# Patient Record
Sex: FEMALE | URBAN - NONMETROPOLITAN AREA
[De-identification: names, ages, dates, MRNs, and addresses within clinical notes are randomized per-mention and may not be internally consistent; named-entity substitution may affect disease eponyms.]

---

## 2018-02-13 ENCOUNTER — APPOINTMENT (RX ONLY)
Dept: URBAN - NONMETROPOLITAN AREA CLINIC 13 | Facility: CLINIC | Age: 58
Setting detail: DERMATOLOGY
End: 2018-02-13

## 2018-02-13 DIAGNOSIS — Z41.9 ENCOUNTER FOR PROCEDURE FOR PURPOSES OTHER THAN REMEDYING HEALTH STATE, UNSPECIFIED: ICD-10-CM

## 2018-02-13 PROCEDURE — ? WAXING

## 2018-02-13 NOTE — PROCEDURE: WAXING
Post-Care Instructions: I reviewed with the patient in detail post-care instructions. Patient should avoid sun exposure and wear sun protection.
Detail Level: Zone
Price (Use Numbers Only, No Special Characters Or $): 25.00
Techique: The unwanted hair in the treatment area(s) were removed using wax.

## 2019-04-19 ENCOUNTER — APPOINTMENT (RX ONLY)
Dept: URBAN - NONMETROPOLITAN AREA CLINIC 13 | Facility: CLINIC | Age: 59
Setting detail: DERMATOLOGY
End: 2019-04-19

## 2019-04-19 DIAGNOSIS — Z41.9 ENCOUNTER FOR PROCEDURE FOR PURPOSES OTHER THAN REMEDYING HEALTH STATE, UNSPECIFIED: ICD-10-CM

## 2019-04-19 PROCEDURE — ? CHEMICAL PEEL

## 2019-04-19 NOTE — PROCEDURE: CHEMICAL PEEL
Chemical Peel: Micropeel 30 Solution
Post Peel Care: Sun protection and post-care instructions were reviewed with the patient
Number Of Layers: 1 more reactive areas(around mouth)/2 on remainder of treatment area
Post-Care Instructions: I reviewed with the patient in detail post-care instructions. Patient should avoid sun exposure and wear sun protection, use gentle skincare until sensitivity subsides.
Treatment Number: 1
Prep: Treatment area cleansed.  The treated area was degreased, and vaseline was applied for protection of mucous membranes.\\n
Detail Level: Zone
Comments: Endpoint: mild erythema
Treatment Time (Optional): 5 minutes on more reactive areas7 minutes rest of treatment area
Consent: Prior to the procedure, written consent was obtained and risks were reviewed, including but not limited to: redness, peeling, blistering, pigmentary change, scarring, infection, and pain.
Price (Use Numbers Only, No Special Characters Or $): 55